# Patient Record
Sex: FEMALE | Race: BLACK OR AFRICAN AMERICAN | NOT HISPANIC OR LATINO | URBAN - METROPOLITAN AREA
[De-identification: names, ages, dates, MRNs, and addresses within clinical notes are randomized per-mention and may not be internally consistent; named-entity substitution may affect disease eponyms.]

---
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
## 2022-01-12 ENCOUNTER — WEB ENCOUNTER (OUTPATIENT)
Dept: URBAN - METROPOLITAN AREA CLINIC 94 | Facility: CLINIC | Age: 22
End: 2022-01-12

## 2022-01-12 ENCOUNTER — LAB OUTSIDE AN ENCOUNTER (OUTPATIENT)
Dept: URBAN - METROPOLITAN AREA CLINIC 94 | Facility: CLINIC | Age: 22
End: 2022-01-12

## 2022-01-12 ENCOUNTER — OFFICE VISIT (OUTPATIENT)
Dept: URBAN - METROPOLITAN AREA CLINIC 94 | Facility: CLINIC | Age: 22
End: 2022-01-12
Payer: COMMERCIAL

## 2022-01-12 VITALS
WEIGHT: 172 LBS | BODY MASS INDEX: 32.47 KG/M2 | HEART RATE: 66 BPM | DIASTOLIC BLOOD PRESSURE: 85 MMHG | SYSTOLIC BLOOD PRESSURE: 125 MMHG | TEMPERATURE: 97.2 F | HEIGHT: 61 IN

## 2022-01-12 DIAGNOSIS — K50.90 CROHN'S DISEASE WITHOUT COMPLICATION, UNSPECIFIED GASTROINTESTINAL TRACT LOCATION: ICD-10-CM

## 2022-01-12 PROBLEM — 34000006: Status: ACTIVE | Noted: 2021-03-18

## 2022-01-12 PROCEDURE — 99213 OFFICE O/P EST LOW 20 MIN: CPT | Performed by: INTERNAL MEDICINE

## 2022-01-12 RX ORDER — INFLIXIMAB-DYYB 100 MG/10ML
700 MG INJECTION, POWDER, LYOPHILIZED, FOR SOLUTION INTRAVENOUS
Qty: 10 | Refills: 3 | Status: ACTIVE | COMMUNITY
Start: 2021-03-19 | End: 2022-03-14

## 2022-01-12 NOTE — HPI-TODAY'S VISIT:
Pt with history of Crohn's disease since age 11 previously maintained on infliximab by Ped GI.  Pt now sees us for management of Crohn's disease. Last colonoscopy in 1/2021 was WNL. Biopsies from terminal ileum and colon WNL. Pt currently on Renflexis ( Infliximab biosimilar) for Crohn's disease. Pt is in clinical remission. Pt denies any GI symptoms such as abdominal pain, diarrhea or rectal bleeding.

## 2022-02-09 ENCOUNTER — TELEPHONE ENCOUNTER (OUTPATIENT)
Dept: URBAN - METROPOLITAN AREA CLINIC 92 | Facility: CLINIC | Age: 22
End: 2022-02-09

## 2022-02-09 ENCOUNTER — LAB OUTSIDE AN ENCOUNTER (OUTPATIENT)
Dept: URBAN - METROPOLITAN AREA CLINIC 92 | Facility: CLINIC | Age: 22
End: 2022-02-09

## 2022-02-09 LAB
A/G RATIO: 1.3
ALBUMIN: 4.5
ALKALINE PHOSPHATASE: 127
ALT (SGPT): 37
AST (SGOT): 26
BASO (ABSOLUTE): 0
BASOS: 1
BILIRUBIN, TOTAL: 0.4
BUN/CREATININE RATIO: 23
BUN: 20
C-REACTIVE PROTEIN, QUANT: 3
CALCIUM: 9.6
CARBON DIOXIDE, TOTAL: 20
CHLORIDE: 100
CREATININE: 0.87
EGFR IF AFRICN AM: 112
EGFR IF NONAFRICN AM: 97
EOS (ABSOLUTE): 0
EOS: 1
GLOBULIN, TOTAL: 3.5
GLUCOSE: 93
HEMATOCRIT: 42.3
HEMATOLOGY COMMENTS:: (no result)
HEMOGLOBIN: 13.8
IMMATURE CELLS: (no result)
IMMATURE GRANS (ABS): 0
IMMATURE GRANULOCYTES: 1
LYMPHS (ABSOLUTE): 2
LYMPHS: 30
MCH: 28.3
MCHC: 32.6
MCV: 87
MONOCYTES(ABSOLUTE): 0.4
MONOCYTES: 6
NEUTROPHILS (ABSOLUTE): 4.2
NEUTROPHILS: 61
NRBC: (no result)
PLATELETS: 181
POTASSIUM: 4.4
PROTEIN, TOTAL: 8
RBC: 4.87
RDW: 14.5
SODIUM: 137
WBC: 6.6

## 2022-02-13 LAB
ANA DIRECT: NEGATIVE
REQUEST PROBLEM: (no result)
SPECIMEN STATUS REPORT: (no result)
WRITTEN AUTHORIZATION: (no result)

## 2022-02-26 LAB
ACTIN (SMOOTH MUSCLE) ANTIBODY: 6
CERULOPLASMIN: 31.7
HBSAG SCREEN: NEGATIVE
HCV AB: <0.1
HEP A AB, IGM: NEGATIVE
HEP B CORE AB, IGM: NEGATIVE
INTERPRETATION:: (no result)
IRON BIND.CAP.(TIBC): 368
IRON SATURATION: 13
IRON: 49
MITOCHONDRIAL (M2) ANTIBODY: <20
UIBC: 319

## 2022-06-02 ENCOUNTER — TELEPHONE ENCOUNTER (OUTPATIENT)
Dept: URBAN - METROPOLITAN AREA CLINIC 94 | Facility: CLINIC | Age: 22
End: 2022-06-02

## 2022-06-15 ENCOUNTER — OFFICE VISIT (OUTPATIENT)
Dept: URBAN - METROPOLITAN AREA CLINIC 94 | Facility: CLINIC | Age: 22
End: 2022-06-15

## 2023-05-31 ENCOUNTER — LAB OUTSIDE AN ENCOUNTER (OUTPATIENT)
Dept: URBAN - METROPOLITAN AREA CLINIC 94 | Facility: CLINIC | Age: 23
End: 2023-05-31

## 2023-05-31 ENCOUNTER — OFFICE VISIT (OUTPATIENT)
Dept: URBAN - METROPOLITAN AREA CLINIC 94 | Facility: CLINIC | Age: 23
End: 2023-05-31
Payer: COMMERCIAL

## 2023-05-31 ENCOUNTER — WEB ENCOUNTER (OUTPATIENT)
Dept: URBAN - METROPOLITAN AREA CLINIC 94 | Facility: CLINIC | Age: 23
End: 2023-05-31

## 2023-05-31 VITALS
BODY MASS INDEX: 34.55 KG/M2 | SYSTOLIC BLOOD PRESSURE: 100 MMHG | HEIGHT: 61 IN | DIASTOLIC BLOOD PRESSURE: 65 MMHG | TEMPERATURE: 97.5 F | HEART RATE: 70 BPM | WEIGHT: 183 LBS

## 2023-05-31 DIAGNOSIS — R10.30 LOWER ABDOMINAL PAIN: ICD-10-CM

## 2023-05-31 DIAGNOSIS — K50.918 CROHN'S DISEASE WITH OTHER COMPLICATION, UNSPECIFIED GASTROINTESTINAL TRACT LOCATION: ICD-10-CM

## 2023-05-31 PROBLEM — 34000006: Status: ACTIVE | Noted: 2023-05-31

## 2023-05-31 PROCEDURE — 99214 OFFICE O/P EST MOD 30 MIN: CPT | Performed by: INTERNAL MEDICINE

## 2023-05-31 RX ORDER — INFLIXIMAB 100 MG/1
AS DIRECTED INJECTION, POWDER, LYOPHILIZED, FOR SOLUTION INTRAVENOUS
Status: ACTIVE | COMMUNITY

## 2023-05-31 NOTE — HPI-TODAY'S VISIT:
Pt with history of Crohn's disease since age 11 previously maintained on infliximab by Ped GI.  Pt now sees us for management of Crohn's disease. Last colonoscopy in 1/2021 was WNL. Biopsies from terminal ileum and colon WNL. Pt currently on Renflexis ( Infliximab biosimilar) for Crohn's disease. Pt states that she was recently evaluated by OBGYN for dyspareunia. States that US and laparoscopy were performed and were reportedly negative Pt c/o lower abdominal pain for 2-3 weeks. Dull discomfort, radiates across lower abdomen. Pain is intermittent and not related to eating BMs soft, 1-2 per day with some mucous. Denies diarrhea or rectal bleeding. Pt compliant with Renflexis for Crohn's disease

## 2023-06-08 LAB
A/G RATIO: 1.2
ABSOLUTE BASOPHILS: 27
ABSOLUTE EOSINOPHILS: 71
ABSOLUTE LYMPHOCYTES: 2430
ABSOLUTE MONOCYTES: 623
ABSOLUTE NEUTROPHILS: 5749
ALBUMIN: 4.1
ALKALINE PHOSPHATASE: 101
ALT (SGPT): 18
AST (SGOT): 18
BASOPHILS: 0.3
BILIRUBIN, TOTAL: 0.3
BUN/CREATININE RATIO: 28
BUN: 29
CALCIUM: 9.6
CARBON DIOXIDE, TOTAL: 23
CHLORIDE: 101
COMMENT: (no result)
CREATININE: 1.05
EGFR: 78
EOSINOPHILS: 0.8
GLOBULIN, TOTAL: 3.4
GLUCOSE: 82
HBSAG SCREEN: (no result)
HEMATOCRIT: 41.5
HEMOGLOBIN: 14
HEP A AB, IGM: (no result)
HEP B CORE AB, IGM: (no result)
HEP C VIRUS AB: 0.11
HEPATITIS C ANTIBODY: (no result)
HS CRP: 1.7
INFLIXIMAB AB, IBD: <10
INFLIXIMAB DRUG LEVEL: 5
INTERPRETATION: (no result)
LYMPHOCYTES: 27.3
MCH: 28.6
MCHC: 33.7
MCV: 84.7
MITOGEN-NIL: >10
MONOCYTES: 7
MPV: 11.1
NEUTROPHILS: 64.6
PLATELET COUNT: 241
POTASSIUM: 4.2
PROTEIN, TOTAL: 7.5
QUANTIFERON NIL VALUE: 0.04
QUANTIFERON TB1 AG VALUE: 0
QUANTIFERON TB2 AG VALUE: 0.01
QUANTIFERON-TB GOLD PLUS: NEGATIVE
RDW: 14.3
RED BLOOD CELL COUNT: 4.9
SODIUM: 133
WHITE BLOOD CELL COUNT: 8.9

## 2023-06-14 ENCOUNTER — CLAIMS CREATED FROM THE CLAIM WINDOW (OUTPATIENT)
Dept: URBAN - METROPOLITAN AREA CLINIC 4 | Facility: CLINIC | Age: 23
End: 2023-06-14
Payer: COMMERCIAL

## 2023-06-14 ENCOUNTER — OFFICE VISIT (OUTPATIENT)
Dept: URBAN - METROPOLITAN AREA SURGERY CENTER 17 | Facility: SURGERY CENTER | Age: 23
End: 2023-06-14
Payer: COMMERCIAL

## 2023-06-14 DIAGNOSIS — K63.89 APPENDICITIS EPIPLOICA: ICD-10-CM

## 2023-06-14 DIAGNOSIS — K31.89 OTHER DISEASES OF STOMACH AND DUODENUM: ICD-10-CM

## 2023-06-14 PROCEDURE — 88305 TISSUE EXAM BY PATHOLOGIST: CPT | Performed by: PATHOLOGY

## 2023-06-14 PROCEDURE — G8907 PT DOC NO EVENTS ON DISCHARG: HCPCS | Performed by: INTERNAL MEDICINE

## 2023-06-14 PROCEDURE — 45380 COLONOSCOPY AND BIOPSY: CPT | Performed by: INTERNAL MEDICINE

## 2023-06-14 RX ORDER — INFLIXIMAB 100 MG/1
AS DIRECTED INJECTION, POWDER, LYOPHILIZED, FOR SOLUTION INTRAVENOUS
Status: ACTIVE | COMMUNITY

## 2023-09-26 ENCOUNTER — TELEPHONE ENCOUNTER (OUTPATIENT)
Dept: URBAN - METROPOLITAN AREA CLINIC 94 | Facility: CLINIC | Age: 23
End: 2023-09-26

## 2023-10-16 ENCOUNTER — TELEPHONE ENCOUNTER (OUTPATIENT)
Dept: URBAN - METROPOLITAN AREA CLINIC 94 | Facility: CLINIC | Age: 23
End: 2023-10-16

## 2023-11-02 ENCOUNTER — OFFICE VISIT (OUTPATIENT)
Dept: URBAN - METROPOLITAN AREA CLINIC 94 | Facility: CLINIC | Age: 23
End: 2023-11-02
Payer: COMMERCIAL

## 2023-11-02 VITALS
BODY MASS INDEX: 33.61 KG/M2 | TEMPERATURE: 97.3 F | OXYGEN SATURATION: 95 % | HEIGHT: 61 IN | WEIGHT: 178 LBS | HEART RATE: 67 BPM | DIASTOLIC BLOOD PRESSURE: 74 MMHG | SYSTOLIC BLOOD PRESSURE: 115 MMHG

## 2023-11-02 DIAGNOSIS — F32.A ACUTE DEPRESSION: ICD-10-CM

## 2023-11-02 DIAGNOSIS — K50.811 CROHN'S DISEASE OF BOTH SMALL AND LARGE INTESTINE WITH RECTAL BLEEDING: ICD-10-CM

## 2023-11-02 PROBLEM — 34000006: Status: ACTIVE | Noted: 2023-11-02

## 2023-11-02 PROCEDURE — 99214 OFFICE O/P EST MOD 30 MIN: CPT | Performed by: INTERNAL MEDICINE

## 2023-11-02 NOTE — HPI-TODAY'S VISIT:
Pt with history of Crohn's disease since age 11 previously maintained on infliximab by [facility].   Since last visit, patient reports chronic abdominal pain for at least 5 mos located in lower abdomen. Pain is intermittent worse with stress. No alleviating factors. Pt also reports blooy stools and mucus frequently most days. Pt also reports substance abuse problems - ETOH and pain medication. Pt reports still taking pain medication - Ibuprofen and Percocet.   Pt was on Reflexis at [facility], but has not had infusion in many mos. At least 6 mos, due to feeling medication was not helping. Pt also admits to feeling very depressed and hopeless. She reports ER visit back in Sept 2023 for suicidal ideation, transferred to [facility] in [place], where she stayed x 2 weeks. She was suppose to have out patient tx but insurance would not cover any psych/counseling facilities in the area. She denies being on medication or even seeing a counselor.  Colonoscopy 6/2023- stool in colon but overall normal appearance to colon - surveillance in 1 yr

## 2023-11-03 LAB
A/G RATIO: 1.3
ABSOLUTE BASOPHILS: 19
ABSOLUTE EOSINOPHILS: 32
ABSOLUTE LYMPHOCYTES: 1707
ABSOLUTE MONOCYTES: 391
ABSOLUTE NEUTROPHILS: 4152
ALBUMIN: 4
ALKALINE PHOSPHATASE: 95
ALT (SGPT): 12
AST (SGOT): 16
BASOPHILS: 0.3
BILIRUBIN, TOTAL: 0.5
BUN/CREATININE RATIO: (no result)
BUN: 19
C-REACTIVE PROTEIN, QUANT: 4.1
CALCIUM: 9.2
CARBON DIOXIDE, TOTAL: 24
CHLORIDE: 104
CREATININE: 0.73
EGFR: 120
EOSINOPHILS: 0.5
GLOBULIN, TOTAL: 3.1
GLUCOSE: 128
HEMATOCRIT: 36.2
HEMOGLOBIN: 12.7
LYMPHOCYTES: 27.1
MCH: 30
MCHC: 35.1
MCV: 85.6
MONOCYTES: 6.2
MPV: 11.7
NEUTROPHILS: 65.9
PLATELET COUNT: 245
POTASSIUM: 4
PROTEIN, TOTAL: 7.1
RDW: 13.1
RED BLOOD CELL COUNT: 4.23
SODIUM: 135
WHITE BLOOD CELL COUNT: 6.3

## 2023-11-10 ENCOUNTER — TELEPHONE ENCOUNTER (OUTPATIENT)
Dept: URBAN - METROPOLITAN AREA CLINIC 94 | Facility: CLINIC | Age: 23
End: 2023-11-10

## 2023-11-10 RX ORDER — USTEKINUMAB 130 MG/26ML
AS DIRECTED SOLUTION INTRAVENOUS ONCE
OUTPATIENT
Start: 2023-11-12 | End: 2023-11-13

## 2023-11-10 RX ORDER — USTEKINUMAB 90 MG/ML
AS DIRECTED INJECTION, SOLUTION SUBCUTANEOUS
Qty: 1 | Refills: 3 | OUTPATIENT
Start: 2023-11-12 | End: 2024-06-23

## 2023-11-11 LAB — CALPROTECTIN, FECAL: 2760

## 2023-11-12 PROBLEM — 56689002: Status: ACTIVE | Noted: 2023-11-12

## 2023-11-13 ENCOUNTER — TELEPHONE ENCOUNTER (OUTPATIENT)
Dept: URBAN - METROPOLITAN AREA CLINIC 118 | Facility: CLINIC | Age: 23
End: 2023-11-13

## 2023-11-14 ENCOUNTER — TELEPHONE ENCOUNTER (OUTPATIENT)
Dept: URBAN - METROPOLITAN AREA CLINIC 94 | Facility: CLINIC | Age: 23
End: 2023-11-14

## 2023-11-14 RX ORDER — RISANKIZUMAB-RZAA 180 MG/1.2
1.2 ML KIT SUBCUTANEOUS
Qty: 2 | Refills: 1 | OUTPATIENT
Start: 2023-11-20 | End: 2024-03-11

## 2023-11-14 RX ORDER — RISANKIZUMAB-RZAA 60 MG/ML
AS DIRECTED INJECTION INTRAVENOUS
Qty: 600 | Refills: 0 | OUTPATIENT
Start: 2023-11-20

## 2023-11-21 ENCOUNTER — TELEPHONE ENCOUNTER (OUTPATIENT)
Dept: URBAN - METROPOLITAN AREA CLINIC 94 | Facility: CLINIC | Age: 23
End: 2023-11-21

## 2023-11-22 ENCOUNTER — TELEPHONE ENCOUNTER (OUTPATIENT)
Dept: URBAN - METROPOLITAN AREA CLINIC 94 | Facility: CLINIC | Age: 23
End: 2023-11-22

## 2023-12-12 ENCOUNTER — TELEPHONE ENCOUNTER (OUTPATIENT)
Dept: URBAN - METROPOLITAN AREA CLINIC 97 | Facility: CLINIC | Age: 23
End: 2023-12-12

## 2024-01-04 ENCOUNTER — OFFICE VISIT (OUTPATIENT)
Dept: URBAN - METROPOLITAN AREA CLINIC 94 | Facility: CLINIC | Age: 24
End: 2024-01-04

## 2024-01-18 ENCOUNTER — TELEPHONE ENCOUNTER (OUTPATIENT)
Dept: URBAN - METROPOLITAN AREA CLINIC 6 | Facility: CLINIC | Age: 24
End: 2024-01-18

## 2024-01-18 RX ORDER — RISANKIZUMAB-RZAA 60 MG/ML
AS DIRECTED INJECTION INTRAVENOUS
OUTPATIENT
Start: 2023-11-20

## 2024-01-18 RX ORDER — RISANKIZUMAB-RZAA 60 MG/ML
AS DIRECTED INJECTION INTRAVENOUS
Qty: 600 | Refills: 0 | OUTPATIENT
Start: 2024-01-22

## 2024-01-18 RX ORDER — RISANKIZUMAB-RZAA 60 MG/ML
AS DIRECTED INJECTION INTRAVENOUS
OUTPATIENT
Start: 2024-01-22

## 2024-01-18 RX ORDER — USTEKINUMAB 90 MG/ML
AS DIRECTED INJECTION, SOLUTION SUBCUTANEOUS
Qty: 1 | Refills: 3
Start: 2023-11-12 | End: 2024-09-19

## 2024-01-18 RX ORDER — RISANKIZUMAB-RZAA 180 MG/1.2
1.2 ML KIT SUBCUTANEOUS
OUTPATIENT
Start: 2024-01-22 | End: 2024-09-02

## 2024-01-18 RX ORDER — RISANKIZUMAB-RZAA 180 MG/1.2
1.2 ML KIT SUBCUTANEOUS
Qty: 1 | Refills: 3 | OUTPATIENT
Start: 2024-01-22 | End: 2024-09-02

## 2024-01-18 RX ORDER — RISANKIZUMAB-RZAA 180 MG/1.2
1.2 ML KIT SUBCUTANEOUS
OUTPATIENT
Start: 2023-11-20 | End: 2024-03-11

## 2024-01-30 ENCOUNTER — DASHBOARD ENCOUNTERS (OUTPATIENT)
Age: 24
End: 2024-01-30

## 2024-03-11 ENCOUNTER — OV EP (OUTPATIENT)
Dept: URBAN - METROPOLITAN AREA CLINIC 94 | Facility: CLINIC | Age: 24
End: 2024-03-11

## 2024-03-11 RX ORDER — USTEKINUMAB 90 MG/ML
AS DIRECTED INJECTION, SOLUTION SUBCUTANEOUS
Qty: 1 | Refills: 3 | Status: ACTIVE | COMMUNITY
Start: 2023-11-12 | End: 2024-09-19

## 2024-03-11 RX ORDER — RISANKIZUMAB-RZAA 60 MG/ML
AS DIRECTED INJECTION INTRAVENOUS
Qty: 600 | Refills: 0 | Status: ACTIVE | COMMUNITY
Start: 2024-01-22

## 2024-03-11 RX ORDER — RISANKIZUMAB-RZAA 180 MG/1.2
1.2 ML KIT SUBCUTANEOUS
Qty: 1 | Refills: 3 | Status: ACTIVE | COMMUNITY
Start: 2024-01-22 | End: 2024-09-02

## 2024-06-06 ENCOUNTER — OFFICE VISIT (OUTPATIENT)
Dept: URBAN - METROPOLITAN AREA CLINIC 94 | Facility: CLINIC | Age: 24
End: 2024-06-06
Payer: COMMERCIAL

## 2024-06-06 VITALS
HEIGHT: 61 IN | OXYGEN SATURATION: 96 % | HEART RATE: 78 BPM | SYSTOLIC BLOOD PRESSURE: 118 MMHG | BODY MASS INDEX: 30.96 KG/M2 | WEIGHT: 164 LBS | DIASTOLIC BLOOD PRESSURE: 80 MMHG | TEMPERATURE: 97.9 F

## 2024-06-06 DIAGNOSIS — K50.911 CROHN'S DISEASE WITH RECTAL BLEEDING, UNSPECIFIED GASTROINTESTINAL TRACT LOCATION: ICD-10-CM

## 2024-06-06 PROCEDURE — 99214 OFFICE O/P EST MOD 30 MIN: CPT | Performed by: INTERNAL MEDICINE

## 2024-06-06 RX ORDER — UPADACITINIB 45 MG/1
AS DIRECTED TABLET, EXTENDED RELEASE ORAL
Qty: 84 | Refills: 0 | OUTPATIENT
Start: 2024-06-06 | End: 2024-08-29

## 2024-06-06 NOTE — HPI-TODAY'S VISIT:
Pt with history of Crohn's disease since age 11   Since her last visit, patient reports Crohn's sx have been better.  She now reports lower abdominal pain with mucus in stool. Only sees small amt of blood in stool but not daily. Denies diarrhea and has 1 BM daily. She does report nauseat after meals but denies vomiting. Since last visit, she has completely changed diet and avoids processed foods. She eats fruits and vegetables. - raw. Denies tobacco or ETOH use. She denies excercise. Sleep is not consistently regular. Also reports anxiety and depression are some better. Denies suicidal thoughts. Unable to see psych as her insurance will not cover medication  Pt previously on Remicade. Stopped Remicade as she did not feel it was helping. Attempted approval for Stelara but insurance would not cover medication.   Colonoscopy 6/2023- stool in colon but overall normal appearance to colon - surveillance in 1 yr

## 2024-06-10 LAB
A/G RATIO: 1.2
ABSOLUTE BASOPHILS: 19
ABSOLUTE EOSINOPHILS: 29
ABSOLUTE LYMPHOCYTES: 1426
ABSOLUTE MONOCYTES: 341
ABSOLUTE NEUTROPHILS: 2986
ALBUMIN: 4.1
ALKALINE PHOSPHATASE: 89
ALT (SGPT): 10
AST (SGOT): 15
BASOPHILS: 0.4
BILIRUBIN, TOTAL: 0.3
BUN/CREATININE RATIO: (no result)
BUN: 18
C-REACTIVE PROTEIN, QUANT: <3
CALCIUM: 9.8
CARBON DIOXIDE, TOTAL: 23
CHLORIDE: 105
CHOL/HDLC RATIO: 2.5
CHOLESTEROL, TOTAL: 130
CREATININE: 0.8
EGFR: 107
EOSINOPHILS: 0.6
GLOBULIN, TOTAL: 3.4
GLUCOSE: 87
HDL CHOLESTEROL: 52
HEMATOCRIT: 39.9
HEMOGLOBIN: 13.2
HEPATITIS B CORE AB TOTAL: (no result)
HEPATITIS B SURFACE AB, QN: 8
HEPATITIS B SURFACE ANTIGEN: (no result)
LDL CHOLESTEROL CALC: 65
LYMPHOCYTES: 29.7
MCH: 28.7
MCHC: 33.1
MCV: 86.7
MITOGEN-NIL: 5.59
MONOCYTES: 7.1
MPV: 11.2
NEUTROPHILS: 62.2
NON HDL CHOLESTEROL: 78
PLATELET COUNT: 250
POTASSIUM: 4.2
PROTEIN, TOTAL: 7.5
QUANTIFERON NIL VALUE: 0.02
QUANTIFERON TB1 AG VALUE: 0
QUANTIFERON TB2 AG VALUE: 0
QUANTIFERON-TB GOLD PLUS: NEGATIVE
RDW: 13.6
RED BLOOD CELL COUNT: 4.6
SODIUM: 137
TRIGLYCERIDES: 51
WHITE BLOOD CELL COUNT: 4.8

## 2024-06-18 ENCOUNTER — TELEPHONE ENCOUNTER (OUTPATIENT)
Dept: URBAN - METROPOLITAN AREA CLINIC 94 | Facility: CLINIC | Age: 24
End: 2024-06-18

## 2024-06-18 RX ORDER — UPADACITINIB 15 MG/1
1 TABLET TABLET, EXTENDED RELEASE ORAL ONCE A DAY
Qty: 30 | Refills: 11 | OUTPATIENT
Start: 2024-06-18 | End: 2025-06-13

## 2024-07-09 ENCOUNTER — TELEPHONE ENCOUNTER (OUTPATIENT)
Dept: URBAN - METROPOLITAN AREA CLINIC 94 | Facility: CLINIC | Age: 24
End: 2024-07-09

## 2024-07-11 ENCOUNTER — TELEPHONE ENCOUNTER (OUTPATIENT)
Dept: URBAN - METROPOLITAN AREA CLINIC 94 | Facility: CLINIC | Age: 24
End: 2024-07-11

## 2024-08-06 ENCOUNTER — OFFICE VISIT (OUTPATIENT)
Dept: URBAN - METROPOLITAN AREA CLINIC 94 | Facility: CLINIC | Age: 24
End: 2024-08-06
Payer: COMMERCIAL

## 2024-08-06 ENCOUNTER — LAB OUTSIDE AN ENCOUNTER (OUTPATIENT)
Dept: URBAN - METROPOLITAN AREA CLINIC 94 | Facility: CLINIC | Age: 24
End: 2024-08-06

## 2024-08-06 VITALS
WEIGHT: 165 LBS | BODY MASS INDEX: 31.15 KG/M2 | DIASTOLIC BLOOD PRESSURE: 78 MMHG | SYSTOLIC BLOOD PRESSURE: 109 MMHG | OXYGEN SATURATION: 97 % | HEART RATE: 82 BPM | HEIGHT: 61 IN | TEMPERATURE: 98.6 F

## 2024-08-06 DIAGNOSIS — R10.11 RUQ PAIN: ICD-10-CM

## 2024-08-06 DIAGNOSIS — K50.911 CROHN'S DISEASE WITH RECTAL BLEEDING, UNSPECIFIED GASTROINTESTINAL TRACT LOCATION: ICD-10-CM

## 2024-08-06 PROCEDURE — 99214 OFFICE O/P EST MOD 30 MIN: CPT | Performed by: INTERNAL MEDICINE

## 2024-08-06 RX ORDER — USTEKINUMAB 130 MG/26ML
AS DIRECTED SOLUTION INTRAVENOUS ONCE
Start: 2023-11-12 | End: 2024-08-12

## 2024-08-06 RX ORDER — UPADACITINIB 45 MG/1
AS DIRECTED TABLET, EXTENDED RELEASE ORAL
Qty: 84 | Refills: 0 | Status: ON HOLD | COMMUNITY
Start: 2024-06-06 | End: 2024-08-29

## 2024-08-06 RX ORDER — BUPROPION HYDROCHLORIDE 75 MG/1
2 TABLETS TABLET, FILM COATED ORAL TWICE A DAY
Status: ACTIVE | COMMUNITY

## 2024-08-06 RX ORDER — UPADACITINIB 15 MG/1
1 TABLET TABLET, EXTENDED RELEASE ORAL ONCE A DAY
Qty: 30 | Refills: 11 | Status: ON HOLD | COMMUNITY
Start: 2024-06-18 | End: 2025-06-13

## 2024-08-06 RX ORDER — USTEKINUMAB 90 MG/ML
AS DIRECTED INJECTION, SOLUTION SUBCUTANEOUS
Qty: 1 | Refills: 3
Start: 2023-11-12 | End: 2025-03-23

## 2024-08-06 NOTE — PHYSICAL EXAM GASTROINTESTINAL
Abdomen , soft, RLQ and RUQ tender, nondistended , no guarding or rigidity , no masses palpable , normal bowel sounds , Liver and Spleen , no hepatomegaly present , no hepatosplenomegaly , liver nontender , spleen not palpable

## 2024-08-06 NOTE — HPI-TODAY'S VISIT:
23 yo F evaluated today for hx of Crohn's Disease.   Pt with history of Crohn's disease since age 11   Following last visit, patient initiated on Rinvoq 45 mg which caused post prandial nausea and felt like it triggered Crohn's flare with more abdominal pain. She took medication x 3 weeks before stopping. Sx did resolve.   Since then, she still has "flares"with RLQ swelling and tenderness. She has been more constipated, solid. Denies diarrhea. She reports eating a low fiber diet. Still with occasional bloody stools.   PCP  Rx Wellbutrin which she started yesterday. Denies tobacco or ETOH use. She denies excercise. Sleep is not consistently regular. Also reports anxiety and depression are some better. Denies suicidal thoughts. Unable to see psych as her insurance will not cover medication  Pt previously on Remicade. Stopped Remicade as she did not feel it was helping. Attempted approval for Stelara but insurance would not cover medication.   Fecal calprotectin 2760 (11/2024)  CT 9/2023- Mild rectosigmoid colitis.  CT enterography 7/2023 -  Unremarkable CT enterography. No evidence for acute Crohn's flare or for small or large bowel stricture to suggest sequelae of prior Crohn's flare.  Colonoscopy 6/2023- stool in colon but overall normal appearance to colon - surveillance in 1 yr

## 2024-08-14 ENCOUNTER — TELEPHONE ENCOUNTER (OUTPATIENT)
Dept: URBAN - METROPOLITAN AREA CLINIC 94 | Facility: CLINIC | Age: 24
End: 2024-08-14

## 2024-08-20 ENCOUNTER — TELEPHONE ENCOUNTER (OUTPATIENT)
Dept: URBAN - METROPOLITAN AREA CLINIC 94 | Facility: CLINIC | Age: 24
End: 2024-08-20

## 2024-08-26 ENCOUNTER — TELEPHONE ENCOUNTER (OUTPATIENT)
Dept: URBAN - METROPOLITAN AREA CLINIC 94 | Facility: CLINIC | Age: 24
End: 2024-08-26

## 2024-09-06 ENCOUNTER — WEB ENCOUNTER (OUTPATIENT)
Dept: URBAN - METROPOLITAN AREA CLINIC 94 | Facility: CLINIC | Age: 24
End: 2024-09-06

## 2024-10-01 ENCOUNTER — OFFICE VISIT (OUTPATIENT)
Dept: URBAN - METROPOLITAN AREA CLINIC 94 | Facility: CLINIC | Age: 24
End: 2024-10-01
Payer: COMMERCIAL

## 2024-10-01 ENCOUNTER — LAB OUTSIDE AN ENCOUNTER (OUTPATIENT)
Dept: URBAN - METROPOLITAN AREA CLINIC 94 | Facility: CLINIC | Age: 24
End: 2024-10-01

## 2024-10-01 VITALS
SYSTOLIC BLOOD PRESSURE: 113 MMHG | TEMPERATURE: 98.7 F | DIASTOLIC BLOOD PRESSURE: 75 MMHG | OXYGEN SATURATION: 97 % | HEART RATE: 88 BPM | WEIGHT: 162 LBS | HEIGHT: 61 IN | BODY MASS INDEX: 30.58 KG/M2

## 2024-10-01 DIAGNOSIS — L98.8 FISTULA: ICD-10-CM

## 2024-10-01 DIAGNOSIS — K50.80 CROHN'S COLITIS: ICD-10-CM

## 2024-10-01 PROCEDURE — 99214 OFFICE O/P EST MOD 30 MIN: CPT | Performed by: INTERNAL MEDICINE

## 2024-10-01 RX ORDER — BUPROPION HYDROCHLORIDE 75 MG/1
2 TABLETS TABLET, FILM COATED ORAL TWICE A DAY
Status: ACTIVE | COMMUNITY

## 2024-10-01 RX ORDER — USTEKINUMAB 90 MG/ML
AS DIRECTED INJECTION, SOLUTION SUBCUTANEOUS
Qty: 1 | Refills: 3 | Status: ON HOLD | COMMUNITY
Start: 2023-11-12 | End: 2025-03-23

## 2024-10-01 NOTE — HPI-TODAY'S VISIT:
25 yo F evaluated today for hx of Crohn's Disease.   Since last visit,  seeing fecal matter coming from vagina when wiping. Also notes blood coming from rectum when she is on her menstrual cycle.   Pt reports having solid stools. Diarrhea on occasion. She denies bloody stools. Denies constipation unless she eats greasy foods. Still gets intermittent lower abdominal pain which is not daily.   Pt was denied Stelara by insurance. They are willing to cover Azathioprine and biologic appears to be Humira. Previously been on Remicade and Rinvoq.   Pt with history of Crohn's disease since age 11 Pt previously on Remicade. Stopped Remicade as she did not feel it was helping. Attempted approval for Stelara but insurance would not cover medication.    Patient initiated on Rinvoq 45 mg June 2024 which caused post prandial nausea and felt like it triggered Crohn's flare with more abdominal pain. She took medication x 3 weeks before stopping. Sx did resolve.   RUQ US - normal GB   Fecal calprotectin 2760 (11/2023)  CT 9/2023- Mild rectosigmoid colitis.  CT enterography 7/2023 -  Unremarkable CT enterography. No evidence for acute Crohn's flare or for small or large bowel stricture to suggest sequelae of prior Crohn's flare.  Colonoscopy 6/2023- stool in colon but overall normal appearance to colon - surveillance in 1 yr

## 2024-11-05 ENCOUNTER — OFFICE VISIT (OUTPATIENT)
Dept: URBAN - METROPOLITAN AREA SURGERY CENTER 17 | Facility: SURGERY CENTER | Age: 24
End: 2024-11-05
Payer: COMMERCIAL

## 2024-11-05 ENCOUNTER — TELEPHONE ENCOUNTER (OUTPATIENT)
Dept: URBAN - METROPOLITAN AREA CLINIC 94 | Facility: CLINIC | Age: 24
End: 2024-11-05

## 2024-11-05 ENCOUNTER — CLAIMS CREATED FROM THE CLAIM WINDOW (OUTPATIENT)
Dept: URBAN - METROPOLITAN AREA CLINIC 4 | Facility: CLINIC | Age: 24
End: 2024-11-05
Payer: COMMERCIAL

## 2024-11-05 DIAGNOSIS — K63.89 OTHER SPECIFIED DISEASES OF INTESTINE: ICD-10-CM

## 2024-11-05 DIAGNOSIS — K51.00 ULCERATIVE CHRONIC PANCOLITIS, WITHOUT COMPLICATIONS: ICD-10-CM

## 2024-11-05 DIAGNOSIS — K51.919 ACUTE ULCERATIVE COLITIS WITH COMPLICATION: ICD-10-CM

## 2024-11-05 DIAGNOSIS — K51.919 ULCERATIVE COLITIS WITH COMPLICATION, UNSPECIFIED LOCATION: ICD-10-CM

## 2024-11-05 DIAGNOSIS — Z87.19 PERSONAL HISTORY OF OTHER DISEASES OF THE DIGESTIVE SYSTEM: ICD-10-CM

## 2024-11-05 PROCEDURE — 45380 COLONOSCOPY AND BIOPSY: CPT | Performed by: INTERNAL MEDICINE

## 2024-11-05 PROCEDURE — 00811 ANES LWR INTST NDSC NOS: CPT | Performed by: NURSE ANESTHETIST, CERTIFIED REGISTERED

## 2024-11-05 PROCEDURE — 88305 TISSUE EXAM BY PATHOLOGIST: CPT | Performed by: PATHOLOGY

## 2024-11-05 RX ORDER — USTEKINUMAB 90 MG/ML
AS DIRECTED INJECTION, SOLUTION SUBCUTANEOUS
Qty: 1 | Refills: 3 | Status: ON HOLD | COMMUNITY
Start: 2023-11-12 | End: 2025-03-23

## 2024-11-05 RX ORDER — BUPROPION HYDROCHLORIDE 75 MG/1
2 TABLETS TABLET, FILM COATED ORAL TWICE A DAY
Status: ACTIVE | COMMUNITY

## 2024-11-19 ENCOUNTER — TELEPHONE ENCOUNTER (OUTPATIENT)
Dept: URBAN - METROPOLITAN AREA CLINIC 94 | Facility: CLINIC | Age: 24
End: 2024-11-19

## 2024-11-19 ENCOUNTER — OFFICE VISIT (OUTPATIENT)
Dept: URBAN - METROPOLITAN AREA CLINIC 94 | Facility: CLINIC | Age: 24
End: 2024-11-19
Payer: COMMERCIAL

## 2024-11-19 VITALS
HEART RATE: 96 BPM | OXYGEN SATURATION: 97 % | WEIGHT: 162 LBS | TEMPERATURE: 98.6 F | SYSTOLIC BLOOD PRESSURE: 104 MMHG | BODY MASS INDEX: 30.58 KG/M2 | HEIGHT: 61 IN | DIASTOLIC BLOOD PRESSURE: 66 MMHG

## 2024-11-19 DIAGNOSIS — K50.111 CROHN'S DISEASE OF COLON WITH RECTAL BLEEDING: ICD-10-CM

## 2024-11-19 PROCEDURE — 99214 OFFICE O/P EST MOD 30 MIN: CPT | Performed by: INTERNAL MEDICINE

## 2024-11-19 RX ORDER — USTEKINUMAB 90 MG/ML
AS DIRECTED INJECTION, SOLUTION SUBCUTANEOUS
Qty: 1 | Refills: 3 | Status: ON HOLD | COMMUNITY
Start: 2023-11-12 | End: 2025-03-23

## 2024-11-19 RX ORDER — BUPROPION HYDROCHLORIDE 75 MG/1
2 TABLETS TABLET, FILM COATED ORAL TWICE A DAY
Status: ACTIVE | COMMUNITY

## 2024-11-19 RX ORDER — USTEKINUMAB 130 MG/26ML
AS DIRECTED SOLUTION INTRAVENOUS ONCE
Start: 2023-11-12 | End: 2024-11-22

## 2024-11-19 RX ORDER — PREDNISONE 10 MG/1
TAKE 4 TABS PO X 1 WEEK, TAKE 3 TABS PO X 1 WEEK, TAKE 2 TABS PO X 1 WEEK, TAKE 1 TAB PO X 1 WEEK TABLET ORAL ONCE A DAY
Qty: 70 | Refills: 0 | OUTPATIENT
Start: 2024-11-21 | End: 2024-12-31

## 2024-11-19 RX ORDER — USTEKINUMAB 90 MG/ML
AS DIRECTED INJECTION, SOLUTION SUBCUTANEOUS
Qty: 1 | Refills: 3
Start: 2023-11-12 | End: 2025-07-03

## 2024-11-19 NOTE — HPI-TODAY'S VISIT:
23 yo F evaluated today for hx of Crohn's Disease.   Colonoscopy - 11/5/2024- TI normal, nml mucosa to cecum, ascending colon, transverse colon, and descencing colon, Inflammation congestion, erosions, erythema from sigmoid colon to anus and rectum. Bx reveals diffuse chronic active colitis, consistent with UC sigmoid colon and rectum. TI, AC, TV, and DC bx reveals no abnormalities.   CT enterography - 10/2024- uncomplicated rectal and short segment splenic flexure colonic IBD, no obstruction, fistula or abscess.   Today patient reports having diarrhea and bloody stools with mucus most days. May have up to 3-4 bloody stools/day.  Pt reports mild lower abdominal pain. Currently not on any medication for Crohn's Disease.   Pt was denied Stelara by insurance.  Previously been on Remicade and Rinvoq., MTX and Azathioprine  Pt with history of Crohn's disease since age 11 Pt previously on Remicade. Stopped Remicade as she did not feel it was helping. Attempted approval for Stelara but insurance would not cover medication.  Previously on Methotrexate when she was younger and tx with pediatric GI. Later took Imuran and developed anemia.    Patient initiated on Rinvoq 45 mg June 2024 which caused post prandial nausea and felt like it triggered Crohn's flare with more abdominal pain. She took medication x 3 weeks before stopping. Sx did resolve.   RUQ US - normal GB   Fecal calprotectin 2760 (11/2023)  CT 9/2023- Mild rectosigmoid colitis.  CT enterography 7/2023 -  Unremarkable CT enterography. No evidence for acute Crohn's flare or for small or large bowel stricture to suggest sequelae of prior Crohn's flare.  Colonoscopy 6/2023- stool in colon but overall normal appearance to colon - surveillance in 1 yr 23 yo F evaluated today for hx of Crohn's Disease.

## 2024-11-21 ENCOUNTER — TELEPHONE ENCOUNTER (OUTPATIENT)
Dept: URBAN - METROPOLITAN AREA CLINIC 6 | Facility: CLINIC | Age: 24
End: 2024-11-21

## 2024-11-21 ENCOUNTER — TELEPHONE ENCOUNTER (OUTPATIENT)
Dept: URBAN - METROPOLITAN AREA CLINIC 94 | Facility: CLINIC | Age: 24
End: 2024-11-21

## 2024-11-21 PROBLEM — 50440006: Status: ACTIVE | Noted: 2024-11-21

## 2025-01-28 ENCOUNTER — WEB ENCOUNTER (OUTPATIENT)
Dept: URBAN - METROPOLITAN AREA CLINIC 94 | Facility: CLINIC | Age: 25
End: 2025-01-28

## 2025-02-10 ENCOUNTER — OFFICE VISIT (OUTPATIENT)
Dept: URBAN - METROPOLITAN AREA CLINIC 94 | Facility: CLINIC | Age: 25
End: 2025-02-10
Payer: COMMERCIAL

## 2025-02-10 VITALS
SYSTOLIC BLOOD PRESSURE: 99 MMHG | OXYGEN SATURATION: 96 % | TEMPERATURE: 97.9 F | HEIGHT: 61 IN | DIASTOLIC BLOOD PRESSURE: 66 MMHG | WEIGHT: 160 LBS | BODY MASS INDEX: 30.21 KG/M2 | HEART RATE: 80 BPM

## 2025-02-10 DIAGNOSIS — R10.13 EPIGASTRIC PAIN: ICD-10-CM

## 2025-02-10 DIAGNOSIS — K50.111 CROHN'S DISEASE OF COLON WITH RECTAL BLEEDING: ICD-10-CM

## 2025-02-10 PROCEDURE — 99214 OFFICE O/P EST MOD 30 MIN: CPT | Performed by: INTERNAL MEDICINE

## 2025-02-10 RX ORDER — OMEPRAZOLE 40 MG/1
1 CAPSULE 30 MINUTES BEFORE MEAL CAPSULE, DELAYED RELEASE ORAL ONCE A DAY
Qty: 30 | Refills: 11 | OUTPATIENT
Start: 2025-02-10

## 2025-02-10 RX ORDER — BUPROPION HYDROCHLORIDE 75 MG/1
2 TABLETS TABLET, FILM COATED ORAL TWICE A DAY
Status: ACTIVE | COMMUNITY

## 2025-02-10 NOTE — PHYSICAL EXAM GASTROINTESTINAL
Abdomen , soft, epigastric  tender, nondistended , no guarding or rigidity , no masses palpable , normal bowel sounds , Liver and Spleen , no hepatomegaly present , no hepatosplenomegaly , liver nontender , spleen not palpable

## 2025-02-10 NOTE — HPI-TODAY'S VISIT:
25 yo F evaluated today for hx of Crohn's Colitis.  Pt reports not being able to eat as much as before. She reports having aphthous ulcers. and energy level is very low. She reports more abdominal pain worse in epigastric region and rectum. She also reports having solid to loose stools with rectal bleeding every time she has a BM. Mucus did resolve. Multiple attempts have been made to get pt approved for Stelara and Skyrizi without success due to insurance denial.   Anxiety has also been much worse. PCP Dr Campbell is managing anxiety and rx Buspar.  Colonoscopy - 11/5/2024- TI normal, nml mucosa to cecum, ascending colon, transverse colon, and descending colon, Inflammation congestion, erosions, erythema from sigmoid colon to anus and rectum. Bx reveals diffuse chronic active colitis, consistent with UC sigmoid colon and rectum. TI, AC, TV, and DC bx reveals no abnormalities.   CT enterography - 10/2024- uncomplicated rectal and short segment splenic flexure colonic IBD, no obstruction, fistula or abscess.  Pt was denied Stelara by insurance.  Previously been on Remicade and Rinvoq., MTX and Azathioprine  Pt with history of Crohn's disease since age 11 Pt previously on Remicade. Stopped Remicade as she did not feel it was helping. Attempted approval for Stelarabut insurance would not cover medication.  Previously on Methotrexate when she was younger and tx with pediatric GI. Later took Imuran and developed anemia.    Patient initiated on Rinvoq 45 mg June 2024 which caused post prandial nausea and felt like it triggered Crohn's flare with more abdominal pain. She took medication x 3 weeks before stopping. Sx did resolve.   RUQ US - normal GB   Fecal calprotectin 2760 (11/2023)  CT 9/2023- Mild rectosigmoid colitis.  CT enterography 7/2023 -  Unremarkable CT enterography. No evidence for acute Crohn's flare or for small or large bowel stricture to suggest sequelae of prior Crohn's flare.  Colonoscopy 6/2023- stool in colon but overall normal appearance to colon - surveillance in 1 yr 25 yo F evaluated today for hx of Crohn's Disease.

## 2025-02-14 LAB
ABSOLUTE BASOPHILS: 17
ABSOLUTE EOSINOPHILS: 29
ABSOLUTE LYMPHOCYTES: 1311
ABSOLUTE MONOCYTES: 354
ABSOLUTE NEUTROPHILS: 4089
BASOPHILS: 0.3
C-REACTIVE PROTEIN, QUANT: <3
COMMENT: (no result)
EOSINOPHILS: 0.5
HEMATOCRIT: 41.3
HEMOGLOBIN: 13.3
INFLIXIMAB AB, IBD: <10
INFLIXIMAB LEVEL, IBD: <0.8
INTERPRETATION: (no result)
LYMPHOCYTES: 22.6
MCH: 28.4
MCHC: 32.2
MCV: 88.1
MONOCYTES: 6.1
MPV: 11.6
NEUTROPHILS: 70.5
PLATELET COUNT: 289
RDW: 14.4
RED BLOOD CELL COUNT: 4.69
WHITE BLOOD CELL COUNT: 5.8

## 2025-04-14 ENCOUNTER — TELEPHONE ENCOUNTER (OUTPATIENT)
Dept: URBAN - METROPOLITAN AREA CLINIC 94 | Facility: CLINIC | Age: 25
End: 2025-04-14

## 2025-04-15 ENCOUNTER — TELEPHONE ENCOUNTER (OUTPATIENT)
Dept: URBAN - METROPOLITAN AREA CLINIC 94 | Facility: CLINIC | Age: 25
End: 2025-04-15

## 2025-04-17 ENCOUNTER — TELEPHONE ENCOUNTER (OUTPATIENT)
Dept: URBAN - METROPOLITAN AREA CLINIC 94 | Facility: CLINIC | Age: 25
End: 2025-04-17

## 2025-05-12 ENCOUNTER — OFFICE VISIT (OUTPATIENT)
Dept: URBAN - METROPOLITAN AREA CLINIC 94 | Facility: CLINIC | Age: 25
End: 2025-05-12
Payer: COMMERCIAL

## 2025-05-12 DIAGNOSIS — K50.111 CROHN'S DISEASE OF COLON WITH RECTAL BLEEDING: ICD-10-CM

## 2025-05-12 DIAGNOSIS — R10.13 EPIGASTRIC PAIN: ICD-10-CM

## 2025-05-12 PROCEDURE — 99214 OFFICE O/P EST MOD 30 MIN: CPT | Performed by: INTERNAL MEDICINE

## 2025-05-12 RX ORDER — BUPROPION HYDROCHLORIDE 75 MG/1
2 TABLETS TABLET, FILM COATED ORAL TWICE A DAY
Status: ACTIVE | COMMUNITY

## 2025-05-12 RX ORDER — OMEPRAZOLE 40 MG/1
1 CAPSULE 30 MINUTES BEFORE MEAL CAPSULE, DELAYED RELEASE ORAL ONCE A DAY
Qty: 30 | Refills: 11 | Status: ACTIVE | COMMUNITY
Start: 2025-02-10

## 2025-05-12 RX ORDER — OMEPRAZOLE 40 MG/1
1 CAPSULE 30 MINUTES BEFORE MEAL CAPSULE, DELAYED RELEASE ORAL ONCE A DAY
Qty: 30 | Refills: 11 | OUTPATIENT
Start: 2025-05-12

## 2025-05-12 NOTE — HPI-TODAY'S VISIT:
25 yo F evaluated today for hx of Crohn's Colitis.  Since her last visit, patient completed induction infusion and now maintenance with 1 injection of Stelara. Pt does reports post prandial cramping in periumbilical and lower abdomen. Denies N/V. Bowel habits are normal with 1-2 stools and may see blood on rare occasion. Stool is solid. Denies constipation. Denies apthous ulcers like before. Pt has applied for disability as it is very difficult for her to work. She does not have a car and has to rely  on [employer] or family members. 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   Anxiety has also been much worse. PCP Dr Campbell is managing anxiety and rx Buspar.  Colonoscopy - 11/5/2024- TI normal, nml mucosa to cecum, ascending colon, transverse colon, and descending colon, Inflammation congestion, erosions, erythema from sigmoid colon to anus and rectum. Bx reveals diffuse chronic active colitis, consistent with UC sigmoid colon and rectum. TI, AC, TV, and DC bx reveals no abnormalities.   CT enterography - 10/2024- uncomplicated rectal and short segment splenic flexure colonic IBD, no obstruction, fistula or abscess.   Previously been on Remicade and Rinvoq., MTX and Azathioprine  Pt with history of Crohn's disease since age 11 Pt previously on Remicade. Stopped Remicade as she did not feel it was helping. [facility]ed approval for [employer] insurance would not cover medication.  Previously on Methotrexate when she was younger and tx with pediatric GI. Later took Imuran and developed anemia.    Patient initiated on Rinvoq 45 mg June 2024 which caused post prandial nausea and felt like it triggered Crohn's flare with more abdominal pain. She took medication x 3 weeks before stopping. Sx did resolve.   RUQ US - normal GB   Fecal calprotectin 2760 (11/2023)  CT 9/2023- Mild rectosigmoid colitis.  CT enterography 7/2023 -  Unremarkable CT enterography. No evidence for acute Crohn's flare or for small or large bowel stricture to suggest sequelae of prior Crohn's flare.  Colonoscopy 6/2023- stool in colon but overall normal appearance to colon - surveillance in 1 yr 25 yo F evaluated today for hx of Crohn's Disease.

## 2025-05-17 ENCOUNTER — WEB ENCOUNTER (OUTPATIENT)
Dept: URBAN - METROPOLITAN AREA CLINIC 94 | Facility: CLINIC | Age: 25
End: 2025-05-17

## 2025-05-19 ENCOUNTER — WEB ENCOUNTER (OUTPATIENT)
Dept: URBAN - METROPOLITAN AREA CLINIC 94 | Facility: CLINIC | Age: 25
End: 2025-05-19

## 2025-07-02 ENCOUNTER — TELEPHONE ENCOUNTER (OUTPATIENT)
Dept: URBAN - METROPOLITAN AREA CLINIC 94 | Facility: CLINIC | Age: 25
End: 2025-07-02

## 2025-07-09 ENCOUNTER — OFFICE VISIT (OUTPATIENT)
Dept: URBAN - METROPOLITAN AREA CLINIC 94 | Facility: CLINIC | Age: 25
End: 2025-07-09